# Patient Record
Sex: MALE | Race: WHITE | Employment: OTHER | RURAL
[De-identification: names, ages, dates, MRNs, and addresses within clinical notes are randomized per-mention and may not be internally consistent; named-entity substitution may affect disease eponyms.]

---

## 2022-11-29 ENCOUNTER — HOSPITAL ENCOUNTER (EMERGENCY)
Facility: HOSPITAL | Age: 62
Discharge: HOME | End: 2022-11-29
Attending: HOSPITALIST
Payer: MEDICARE

## 2022-11-29 ENCOUNTER — APPOINTMENT (OUTPATIENT)
Dept: GENERAL RADIOLOGY | Facility: HOSPITAL | Age: 62
End: 2022-11-29
Payer: MEDICARE

## 2022-11-29 VITALS
DIASTOLIC BLOOD PRESSURE: 73 MMHG | SYSTOLIC BLOOD PRESSURE: 110 MMHG | HEART RATE: 53 BPM | TEMPERATURE: 98.3 F | HEIGHT: 69 IN | OXYGEN SATURATION: 98 % | BODY MASS INDEX: 23.7 KG/M2 | RESPIRATION RATE: 16 BRPM | WEIGHT: 160 LBS

## 2022-11-29 DIAGNOSIS — Z51.89 VISIT FOR WOUND CHECK: ICD-10-CM

## 2022-11-29 DIAGNOSIS — S61.431A PUNCTURE WOUND OF RIGHT HAND WITHOUT FOREIGN BODY, INITIAL ENCOUNTER: Primary | ICD-10-CM

## 2022-11-29 PROCEDURE — 99283 EMERGENCY DEPT VISIT LOW MDM: CPT

## 2022-11-29 PROCEDURE — 73130 X-RAY EXAM OF HAND: CPT

## 2022-11-29 RX ORDER — LISINOPRIL 20 MG/1
20 TABLET ORAL DAILY
COMMUNITY

## 2022-11-29 RX ORDER — CLINDAMYCIN HYDROCHLORIDE 300 MG/1
300 CAPSULE ORAL 3 TIMES DAILY
Qty: 30 CAPSULE | Refills: 0 | Status: SHIPPED | OUTPATIENT
Start: 2022-11-29 | End: 2022-12-09

## 2022-11-29 NOTE — ED PROVIDER NOTES
[address] [facility] [address] ENCOUNTER      Pt Name: Maranda Dooley  MRN: [id number]  YOB: 1960  Date of evaluation: 11/29/2022  Provider: Cindy Mendoza DO    CHIEF COMPLAINT       Chief Complaint   Patient presents with    Abscess     Pt  states he caught hand on tree limb while riding his 4 cunningham in the woods to his [place] about 3 weeks ago. Has been doctoring at home. HISTORY OF PRESENT ILLNESS  (Location/Symptom, Timing/Onset, Context/Setting, Quality, Duration, Modifying Factors, Severity.)   Maranda Dooley is a 65 y.o. male who presents to the emergency department for possible abscess to the right hand. Patient states that about 3 weeks ago he was going to his deer stand and he had cut some limbs back so he can get a 4 cunningham through there. Patient states that he was going down the trail limb poked him in the right hand. States that got him in between the web area from his thumb and his second finger. States since then he has had a area that is raised up and has become hard. It is scabbed. Never got anything out of it as far as he knows. It is slightly tender to squeeze or put a lot of pressure on the just to feel over the top he has no discomfort. Patient's family was concerned so they brought him in for evaluation here concerned that he could possibly have an abscess to this area. Patient is right-hand dominant but he denies any numbness tingling or weakness to the hand or fingers. Denies any other trauma or injury to the area. Denies any other complaint at this time patient states he has been using peroxide to the wound and cleaning it every day with this. Nursing notes were reviewed.     REVIEW OFSYSTEMS    (2-9 systems for level 4, 10 or more for level 5)   ROS:  General:  No fevers, no chills, no weakness  Cardiovascular:  No chest pain, no palpitations  Respiratory:  No shortness of breath, no cough, no wheezing  Gastrointestinal:  No pain, no nausea, no vomiting, no diarrhea  Musculoskeletal:  No muscle pain, no joint pain  Skin:  No rash, no easy bruising, +right hand wound  Neurologic:  No speech problems, no headache, no extremity weakness  Psychiatric:  No anxiety  Genitourinary:  No dysuria, no hematuria    Except as noted above the remainder of the review of systems was reviewed and negative. PAST MEDICAL HISTORY     Past Medical History:   Diagnosis Date    Cerebral artery occlusion with cerebral infarction ([address]     Hyperlipidemia     Hypertension     Pain     Lt shoulder         SURGICAL HISTORY       Past Surgical History:   Procedure Laterality Date    FRACTURE SURGERY           CURRENT MEDICATIONS       Previous Medications    GABAPENTIN PO    Take 300 mg by mouth 3 times daily    LISINOPRIL (PRINIVIL;ZESTRIL) 20 MG TABLET    Take 20 mg by mouth daily       ALLERGIES     Patient has no known allergies. FAMILY HISTORY     History reviewed. No pertinent family history. SOCIAL HISTORY       Social History     Socioeconomic History    Marital status:      Spouse name: None    Number of children: None    Years of education: None    Highest education level: None   Tobacco Use    Smoking status: Every Day     Packs/day: 1.00     Types: Cigarettes     Passive exposure: Never    Smokeless tobacco: Never   Vaping Use    Vaping Use: Never used   Substance and Sexual Activity    Alcohol use: Not Currently    Drug use: Never         PHYSICAL EXAM    (up to 7 for level 4, 8 or more for level 5)     ED Triage Vitals   BP Temp Temp src Pulse Resp SpO2 Height Weight   -- -- -- -- -- -- -- --       Physical Exam  General :Patient is awake, alert, oriented, in no acute distress, nontoxic appearing  HEENT: Pupils are equally round and reactive to light, EOMI, conjunctivae clear. Oral mucosa is moist, no exudate.  Uvula is midline  Cardiac: Heart regular rate, rhythm, no murmurs, rubs, or DIFFERENTIAL DIAGNOSIS/MDM:   Vitals:    Vitals:    11/29/22 1113   BP: 110/73   Pulse: 53   Resp: 16   Temp: 98.3 °F (36.8 °C)   TempSrc: Oral   SpO2: 99%   Weight: 160 lb (72.6 kg)   Height: 5' 9\" (1.753 m)       MEDICATIONS ADMINISTERED IN ED:  Medications - No data to display    After initial evaluation examination I did have conversation with the patient about upcoming plan, treatment possible disposition which they are agreeable to the times dictation. Advised that we would perform radiograph of the right hand to make sure there is no retained foreign body which may be there causing his wound continue. Also advised to quit using the peroxide over the area because that could also be delaying the healing of the area also. Advised just use soap and water to clean the area he can also use a warm moist compress for soaks to keep the scabbing area soft in case there is any drainage but at this time there is no obvious abscess or drainage that is purulent from the area. There is nothing to culture. Advised we would still going to cover him with antibiotics and place him on clindamycin 1 tablet 3 times a day for 10 days. Will perform the radiograph make sure there is no retained foreign body which could be causing a continuation of the lesion. Otherwise patient's resting comfort on stretcher no acute distress nontoxic-appearing. Final disposition will determine once his radiological studies been performed reviewed. Radiograph of the right hand read by radiology as soft tissue swelling with no evidence of soft tissue gas or radiopaque foreign body. 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    Patient's radiological findings were discussed with him and he does state his understanding. Patient advised of the negative findings on the radiograph. We will go ahead and place him on the antibiotic. Otherwise discharged home in stable condition.       The patient advised they do need to follow-up with a regular family physician within the next 1 to 2 days for evaluation. They are also given instructions if symptoms worsens or new symptoms arise he should return back to emergency department for further evaluation work-up. Is this patient to be included in the SEP-1 Core Measure due to severe sepsis or septic shock? No   Exclusion criteria - the patient is NOT to be included for SEP-1 Core Measure due to:  2+ SIRS criteria are not met          CONSULTS:  None    PROCEDURES:  Procedures    CRITICAL CARE TIME    Total Critical Care time was 0 minutes, excluding separately reportable procedures. There was a high probability of clinically significant/life threatening deterioration in the patient's condition which required my urgent intervention. FINAL IMPRESSION      1. Puncture wound of right hand without foreign body, initial encounter    2. Visit for wound check          DISPOSITION/PLAN   DISPOSITION Decision To Discharge 11/29/2022 12:00:31 PM      PATIENT REFERRED TO:  Nery Miranda MD  [address] [place] [address]  [phone]    In 2 days      [facility] Emergency Department  [facility] [address] [facility]  [phone]    As needed, If symptoms worsen    DISCHARGE MEDICATIONS:  New Prescriptions    CLINDAMYCIN (CLEOCIN) 300 MG CAPSULE    Take 1 capsule by mouth 3 times daily for 10 days       Comment: Please note this report has been produced using speech recognition software and may contain errorsrelated to that system including errors in grammar, punctuation, and spelling, as well as words and phrases that may be inappropriate. If there are any questions or concerns please feel free to contact the dictating providerfor clarification.     Luis Daniel Gomes DO  Attending Emergency Physician               Luis Daniel Gomes DO  11/29/22 5665